# Patient Record
Sex: MALE | Race: WHITE | URBAN - METROPOLITAN AREA
[De-identification: names, ages, dates, MRNs, and addresses within clinical notes are randomized per-mention and may not be internally consistent; named-entity substitution may affect disease eponyms.]

---

## 2022-03-07 ENCOUNTER — OFFICE VISIT (OUTPATIENT)
Dept: URBAN - METROPOLITAN AREA CLINIC 23 | Facility: CLINIC | Age: 32
End: 2022-03-07
Payer: COMMERCIAL

## 2022-03-07 DIAGNOSIS — S05.8X1A OTHER INJURIES OF RIGHT EYE AND ORBIT, INITIAL ENCOUNTER: Primary | ICD-10-CM

## 2022-03-07 PROCEDURE — 99204 OFFICE O/P NEW MOD 45 MIN: CPT | Performed by: OPTOMETRIST

## 2022-03-07 RX ORDER — PREDNISOLONE ACETATE 10 MG/ML
1 % SUSPENSION/ DROPS OPHTHALMIC
Qty: 5 | Refills: 0 | Status: INACTIVE
Start: 2022-03-07 | End: 2022-03-07

## 2022-03-07 RX ORDER — PREDNISOLONE ACETATE 10 MG/ML
1 % SUSPENSION/ DROPS OPHTHALMIC
Qty: 5 | Refills: 0 | Status: INACTIVE
Start: 2022-03-07 | End: 2022-03-21

## 2022-03-07 ASSESSMENT — INTRAOCULAR PRESSURE
OS: 14
OD: 18

## 2022-03-07 NOTE — IMPRESSION/PLAN
Impression: Other injuries of right eye and orbit, initial encounter: S05. 8X1A. Plan: Pt edu on all findings. Uvetis secondary to trauma. Start Pred Acetate QID OD for the next 2 weeks then taper. RTC 2 weeks for f/u.

## 2022-03-21 ENCOUNTER — OFFICE VISIT (OUTPATIENT)
Dept: URBAN - METROPOLITAN AREA CLINIC 23 | Facility: CLINIC | Age: 32
End: 2022-03-21
Payer: COMMERCIAL

## 2022-03-21 PROCEDURE — 99213 OFFICE O/P EST LOW 20 MIN: CPT | Performed by: OPTOMETRIST

## 2022-03-21 RX ORDER — PREDNISOLONE ACETATE 10 MG/ML
1 % SUSPENSION/ DROPS OPHTHALMIC
Qty: 5 | Refills: 1 | Status: ACTIVE
Start: 2022-03-21

## 2022-03-21 ASSESSMENT — INTRAOCULAR PRESSURE
OD: 21
OS: 14

## 2022-03-21 ASSESSMENT — VISUAL ACUITY: OD: 20/30

## 2022-03-21 NOTE — IMPRESSION/PLAN
Impression: Other injuries of right eye and orbit, subsequent encounter: S05.8X1D. Plan: Pt edu on all findings. Inflammation remains OD. Continue Pred Acetate as prescribed OD. Advised patient to call with any sudden vision changes. RTC 3 week f/u.

## 2022-04-11 ENCOUNTER — OFFICE VISIT (OUTPATIENT)
Dept: URBAN - METROPOLITAN AREA CLINIC 23 | Facility: CLINIC | Age: 32
End: 2022-04-11
Payer: COMMERCIAL

## 2022-04-11 DIAGNOSIS — S05.8X1D OTHER INJURIES OF RIGHT EYE AND ORBIT, SUBSEQUENT ENCOUNTER: Primary | ICD-10-CM

## 2022-04-11 PROCEDURE — 99212 OFFICE O/P EST SF 10 MIN: CPT | Performed by: OPTOMETRIST

## 2022-04-11 ASSESSMENT — INTRAOCULAR PRESSURE
OS: 18
OD: 18

## 2022-04-11 NOTE — IMPRESSION/PLAN
Impression: Other injuries of right eye and orbit, subsequent encounter: S05.8X1D. Plan: Pt edu. Stop pred drops. Only mild pupil abnormality remains. Continue with yearly eye exams.